# Patient Record
Sex: FEMALE | Race: BLACK OR AFRICAN AMERICAN | Employment: UNEMPLOYED | URBAN - METROPOLITAN AREA
[De-identification: names, ages, dates, MRNs, and addresses within clinical notes are randomized per-mention and may not be internally consistent; named-entity substitution may affect disease eponyms.]

---
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
## 2023-01-07 ENCOUNTER — HOSPITAL ENCOUNTER (EMERGENCY)
Age: 18
Discharge: HOME | End: 2023-01-08

## 2024-02-09 ENCOUNTER — HOSPITAL ENCOUNTER (EMERGENCY)
Age: 19
Discharge: HOME | End: 2024-02-09

## 2024-02-09 VITALS
HEIGHT: 67 IN | RESPIRATION RATE: 16 BRPM | HEART RATE: 89 BPM | TEMPERATURE: 98.8 F | OXYGEN SATURATION: 100 % | SYSTOLIC BLOOD PRESSURE: 114 MMHG | WEIGHT: 138 LBS | DIASTOLIC BLOOD PRESSURE: 78 MMHG | BODY MASS INDEX: 21.66 KG/M2

## 2024-02-09 DIAGNOSIS — J02.9 ACUTE SORE THROAT: Primary | ICD-10-CM

## 2024-02-09 LAB
FLUAV RNA SPEC QL NAA+PROBE: NOT DETECTED
FLUBV RNA SPEC QL NAA+PROBE: NOT DETECTED
SARS-COV-2 RDRP RESP QL NAA+PROBE: NOT DETECTED
SOURCE: NORMAL
STREP, MOLECULAR: NOT DETECTED

## 2024-02-09 PROCEDURE — 87635 SARS-COV-2 COVID-19 AMP PRB: CPT

## 2024-02-09 PROCEDURE — 87651 STREP A DNA AMP PROBE: CPT

## 2024-02-09 PROCEDURE — 87502 INFLUENZA DNA AMP PROBE: CPT

## 2024-02-09 PROCEDURE — 99283 EMERGENCY DEPT VISIT LOW MDM: CPT

## 2024-02-09 ASSESSMENT — PAIN DESCRIPTION - LOCATION: LOCATION: HEAD;THROAT

## 2024-02-09 ASSESSMENT — PAIN SCALES - GENERAL: PAINLEVEL_OUTOF10: 5

## 2024-02-09 ASSESSMENT — PAIN - FUNCTIONAL ASSESSMENT: PAIN_FUNCTIONAL_ASSESSMENT: 0-10

## 2024-02-09 NOTE — ED TRIAGE NOTES
Patient complains of headache, sore throat, and voice is gone. Patient reports it started two days ago.

## 2024-02-09 NOTE — ED NOTES
I have reviewed discharge instructions with the patient and parent.  The patient and parent verbalized understanding.    Patient left ED via Discharge Method: ambulatory to Home with (parent).    Opportunity for questions and clarification provided.       Patient given 0 scripts.   No esign      To continue your aftercare when you leave the hospital, you may receive an automated call from our care team to check in on how you are doing.  This is a free service and part of our promise to provide the best care and service to meet your aftercare needs.” If you have questions, or wish to unsubscribe from this service please call [phone].  Thank you for Choosing our [facility] Emergency Department.       Damaris Salazar, MAMI  02/09/24 3011

## 2024-02-09 NOTE — DISCHARGE INSTRUCTIONS
Your strep, COVID, and flu swabs are negative.  Your symptoms are consistent with a viral infection.  This will take some time to improve.  Antibiotics are not effective for viral infections.  Take Tylenol or ibuprofen if needed for pain or headache.  Stay well-hydrated.  Use over-the-counter sore throat spray or lozenges to help with the sore throat.  Warm salt water gargles may also help with discomfort.  Return to the emergency department for any new, worsening, or concerning symptoms.

## 2024-02-09 NOTE — ED PROVIDER NOTES
Emergency Department Provider Note       PCP: No primary care provider on file.   Age: 18 y.o.   Sex: female     DISPOSITION Decision To Discharge 02/09/2024 12:15:57 PM       ICD-10-CM    1. Acute sore throat  J02.9           Medical Decision Making     Complexity of Problems Addressed:  Complexity of Problem: 1 acute, uncomplicated illness or injury.    Data Reviewed and Analyzed:  I independently ordered and reviewed each unique test.             Discussion of management or test interpretation.  Well-appearing 18-year-old female presents emergency department today with complaint of sore throat and headache.  She appears in no acute distress.  No focal neurological deficits noted on exam.  Headache is frontal in nature.  No angioedema.  No sign of tonsillar abscess.  Symptoms consistent with viral etiology.  Through shared decision-making, we will check for strep, COVID, and flu.    Swabs are negative.  Conservative/supportive treatment for suspected viral illness discussed and encouraged.  Return precautions discussed.       Risk of Complications and/or Morbidity of Patient Management:  Shared medical decision making was utilized in creating the patients health plan today.        History      18-year-old female presents emergency department today with complaint of sore throat and headache since Wednesday.  She denies any nausea, vomiting, diarrhea, chest pain, abdominal pain, shortness of breath, or difficulty swallowing.  She denies any known fevers.  She has been taking over-the-counter medications without much relief.  Her family member states that she is now starting with symptoms.  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    The history is provided by the patient.        Physical Exam     Vitals signs and nursing note reviewed.   Vitals:    02/09/24 1048 02/09/24 1049 02/09/24 1220   BP: (!) 126/93  114/78   Pulse: 97  89   Resp: 16     Temp: 98.8 °F (37.1 °C)